# Patient Record
Sex: FEMALE | Race: WHITE | NOT HISPANIC OR LATINO | URBAN - METROPOLITAN AREA
[De-identification: names, ages, dates, MRNs, and addresses within clinical notes are randomized per-mention and may not be internally consistent; named-entity substitution may affect disease eponyms.]

---

## 2021-04-27 NOTE — PATIENT DISCUSSION
The patient wears CL with progressives to read with. The patient denies reading without correction on a routine basis.

## 2021-04-27 NOTE — PATIENT DISCUSSION
***5/13/2021:  The patient has reconsidered her goal for surgery. The patient was informed that with [address] [facility] for distance, they will need glasses for all near and intermediate activities after surgery. The patient understands there is a possibility they may need an enhancement after surgery. The patient elects Custom Vision OS, goal of emmetropia. Cole***.

## 2021-04-27 NOTE — PATIENT DISCUSSION
Patient educated to discontinue contact lens wear until after repeat SO's. If patient wears CL's after that, must discontinue at least one day prior to cataract surgery.

## 2021-05-11 NOTE — PATIENT DISCUSSION
The patient was informed that with [address] for distance, they will need glasses for all near and intermediate activities after surgery. The patient understands that they will have a blended vision, that OS will be clearer in the distance then OD. The patient understands there is a possibility they may need an enhancement after surgery. The patient elects Custom Eyhance OS, goal of emmetropia.

## 2021-05-17 NOTE — PATIENT DISCUSSION
***5/13/2021:  The patient has reconsidered her goal for surgery. The patient was informed that with [address] [facility] for distance, they will need glasses for all near and intermediate activities after surgery. The patient understands there is a possibility they may need an enhancement after surgery. The patient elects Custom Vision OS, goal of emmetropia. Cole***.

## 2021-05-21 NOTE — PATIENT DISCUSSION
Cataract surgery has been performed in the first eye and activities of daily living are still impaired. The patient would like to proceed with cataract surgery in the second eye as scheduled. The patient elects CV IOL OD, goal of emmetropia.

## 2021-12-08 ENCOUNTER — NEW PATIENT (OUTPATIENT)
Dept: URBAN - METROPOLITAN AREA CLINIC 17 | Facility: CLINIC | Age: 81
End: 2021-12-08

## 2021-12-08 DIAGNOSIS — H35.3131: ICD-10-CM

## 2021-12-08 PROCEDURE — 92134 CPTRZ OPH DX IMG PST SGM RTA: CPT

## 2021-12-08 PROCEDURE — 99204 OFFICE O/P NEW MOD 45 MIN: CPT

## 2021-12-08 ASSESSMENT — TONOMETRY
OD_IOP_MMHG: 11
OS_IOP_MMHG: 11

## 2021-12-08 ASSESSMENT — VISUAL ACUITY
OU_SC: 20/25-1
OD_SC: 20/30+1
OS_SC: 20/25-1

## 2023-05-06 NOTE — PATIENT DISCUSSION
Recommended observation. If you are a smoker, it is important for your health to stop smoking. Please be aware that second hand smoke is also harmful. JENNIFER:'[id number]'

## 2024-12-01 NOTE — PATIENT DISCUSSION
Patient educated to discontinue contact lens wear until after repeat SO's. If patient wears CL's after that, must discontinue at least one day prior to cataract surgery. <-- Click to add NO pertinent Past Medical History

## 2025-04-30 ENCOUNTER — NEW PATIENT (OUTPATIENT)
Age: 85
End: 2025-04-30

## 2025-04-30 DIAGNOSIS — H04.123: ICD-10-CM

## 2025-04-30 DIAGNOSIS — H35.3131: ICD-10-CM

## 2025-04-30 DIAGNOSIS — H52.222: ICD-10-CM

## 2025-04-30 PROCEDURE — 92134 CPTRZ OPH DX IMG PST SGM RTA: CPT

## 2025-04-30 PROCEDURE — 92004 COMPRE OPH EXAM NEW PT 1/>: CPT

## 2025-04-30 PROCEDURE — 92015 DETERMINE REFRACTIVE STATE: CPT
